# Patient Record
Sex: MALE | Race: WHITE | NOT HISPANIC OR LATINO | URBAN - METROPOLITAN AREA
[De-identification: names, ages, dates, MRNs, and addresses within clinical notes are randomized per-mention and may not be internally consistent; named-entity substitution may affect disease eponyms.]

---

## 2018-10-22 ENCOUNTER — INPATIENT (INPATIENT)
Facility: HOSPITAL | Age: 78
LOS: 2 days | Discharge: NURSING, REHAB OR LONG-TERM CARE | DRG: 603 | End: 2018-10-25
Attending: SURGERY | Admitting: SURGERY
Payer: MEDICARE

## 2018-10-22 VITALS
TEMPERATURE: 99 F | SYSTOLIC BLOOD PRESSURE: 137 MMHG | WEIGHT: 134.92 LBS | DIASTOLIC BLOOD PRESSURE: 83 MMHG | HEART RATE: 70 BPM | OXYGEN SATURATION: 100 % | RESPIRATION RATE: 18 BRPM

## 2018-10-22 LAB
ANION GAP SERPL CALC-SCNC: 16 MMOL/L — SIGNIFICANT CHANGE UP (ref 5–17)
APTT BLD: 30.6 SEC — SIGNIFICANT CHANGE UP (ref 27.5–37.4)
BASOPHILS NFR BLD AUTO: 0 % — SIGNIFICANT CHANGE UP (ref 0–2)
BLD GP AB SCN SERPL QL: NEGATIVE — SIGNIFICANT CHANGE UP
BUN SERPL-MCNC: 39 MG/DL — HIGH (ref 7–23)
CALCIUM SERPL-MCNC: 9.6 MG/DL — SIGNIFICANT CHANGE UP (ref 8.4–10.5)
CHLORIDE SERPL-SCNC: 91 MMOL/L — LOW (ref 96–108)
CO2 SERPL-SCNC: 27 MMOL/L — SIGNIFICANT CHANGE UP (ref 22–31)
CREAT SERPL-MCNC: 1.21 MG/DL — SIGNIFICANT CHANGE UP (ref 0.5–1.3)
EOSINOPHIL NFR BLD AUTO: 0.5 % — SIGNIFICANT CHANGE UP (ref 0–6)
GLUCOSE SERPL-MCNC: 125 MG/DL — HIGH (ref 70–99)
HCT VFR BLD CALC: 42.4 % — SIGNIFICANT CHANGE UP (ref 39–50)
HGB BLD-MCNC: 14.1 G/DL — SIGNIFICANT CHANGE UP (ref 13–17)
INR BLD: 1.53 — HIGH (ref 0.88–1.16)
LYMPHOCYTES # BLD AUTO: 4.5 % — LOW (ref 13–44)
MCHC RBC-ENTMCNC: 29.6 PG — SIGNIFICANT CHANGE UP (ref 27–34)
MCHC RBC-ENTMCNC: 33.3 G/DL — SIGNIFICANT CHANGE UP (ref 32–36)
MCV RBC AUTO: 88.9 FL — SIGNIFICANT CHANGE UP (ref 80–100)
MONOCYTES NFR BLD AUTO: 6.6 % — SIGNIFICANT CHANGE UP (ref 2–14)
NEUTROPHILS NFR BLD AUTO: 88.4 % — HIGH (ref 43–77)
PLATELET # BLD AUTO: 217 K/UL — SIGNIFICANT CHANGE UP (ref 150–400)
POTASSIUM SERPL-MCNC: 3.9 MMOL/L — SIGNIFICANT CHANGE UP (ref 3.5–5.3)
POTASSIUM SERPL-SCNC: 3.9 MMOL/L — SIGNIFICANT CHANGE UP (ref 3.5–5.3)
PROTHROM AB SERPL-ACNC: 17.1 SEC — HIGH (ref 9.8–12.7)
RBC # BLD: 4.77 M/UL — SIGNIFICANT CHANGE UP (ref 4.2–5.8)
RBC # FLD: 18.4 % — HIGH (ref 10.3–16.9)
RH IG SCN BLD-IMP: POSITIVE — SIGNIFICANT CHANGE UP
SODIUM SERPL-SCNC: 134 MMOL/L — LOW (ref 135–145)
WBC # BLD: 8.4 K/UL — SIGNIFICANT CHANGE UP (ref 3.8–10.5)
WBC # FLD AUTO: 8.4 K/UL — SIGNIFICANT CHANGE UP (ref 3.8–10.5)

## 2018-10-22 PROCEDURE — 71045 X-RAY EXAM CHEST 1 VIEW: CPT | Mod: 26

## 2018-10-22 PROCEDURE — 93970 EXTREMITY STUDY: CPT | Mod: 26

## 2018-10-22 PROCEDURE — 99285 EMERGENCY DEPT VISIT HI MDM: CPT

## 2018-10-22 RX ORDER — VANCOMYCIN HCL 1 G
1000 VIAL (EA) INTRAVENOUS EVERY 24 HOURS
Qty: 0 | Refills: 0 | Status: DISCONTINUED | OUTPATIENT
Start: 2018-10-22 | End: 2018-10-23

## 2018-10-22 RX ORDER — INFLUENZA VIRUS VACCINE 15; 15; 15; 15 UG/.5ML; UG/.5ML; UG/.5ML; UG/.5ML
0.5 SUSPENSION INTRAMUSCULAR ONCE
Qty: 0 | Refills: 0 | Status: DISCONTINUED | OUTPATIENT
Start: 2018-10-22 | End: 2018-10-25

## 2018-10-22 RX ORDER — ASPIRIN/CALCIUM CARB/MAGNESIUM 324 MG
1 TABLET ORAL
Qty: 0 | Refills: 0 | COMMUNITY

## 2018-10-22 RX ORDER — OXYCODONE AND ACETAMINOPHEN 5; 325 MG/1; MG/1
1 TABLET ORAL EVERY 6 HOURS
Qty: 0 | Refills: 0 | Status: DISCONTINUED | OUTPATIENT
Start: 2018-10-22 | End: 2018-10-25

## 2018-10-22 RX ORDER — DOCUSATE SODIUM 100 MG
100 CAPSULE ORAL THREE TIMES A DAY
Qty: 0 | Refills: 0 | Status: DISCONTINUED | OUTPATIENT
Start: 2018-10-22 | End: 2018-10-25

## 2018-10-22 RX ORDER — HEPARIN SODIUM 5000 [USP'U]/ML
5000 INJECTION INTRAVENOUS; SUBCUTANEOUS EVERY 8 HOURS
Qty: 0 | Refills: 0 | Status: DISCONTINUED | OUTPATIENT
Start: 2018-10-22 | End: 2018-10-22

## 2018-10-22 RX ORDER — TAMSULOSIN HYDROCHLORIDE 0.4 MG/1
0.4 CAPSULE ORAL AT BEDTIME
Qty: 0 | Refills: 0 | Status: DISCONTINUED | OUTPATIENT
Start: 2018-10-22 | End: 2018-10-25

## 2018-10-22 RX ORDER — OXYCODONE AND ACETAMINOPHEN 5; 325 MG/1; MG/1
2 TABLET ORAL EVERY 6 HOURS
Qty: 0 | Refills: 0 | Status: DISCONTINUED | OUTPATIENT
Start: 2018-10-22 | End: 2018-10-25

## 2018-10-22 RX ORDER — NYSTATIN CREAM 100000 [USP'U]/G
1 CREAM TOPICAL
Qty: 0 | Refills: 0 | Status: DISCONTINUED | OUTPATIENT
Start: 2018-10-22 | End: 2018-10-25

## 2018-10-22 RX ORDER — VANCOMYCIN HCL 1 G
1000 VIAL (EA) INTRAVENOUS ONCE
Qty: 0 | Refills: 0 | Status: COMPLETED | OUTPATIENT
Start: 2018-10-22 | End: 2018-10-22

## 2018-10-22 RX ORDER — TAMSULOSIN HYDROCHLORIDE 0.4 MG/1
1 CAPSULE ORAL
Qty: 0 | Refills: 0 | COMMUNITY

## 2018-10-22 RX ORDER — CIPROFLOXACIN LACTATE 400MG/40ML
500 VIAL (ML) INTRAVENOUS EVERY 12 HOURS
Qty: 0 | Refills: 0 | Status: DISCONTINUED | OUTPATIENT
Start: 2018-10-22 | End: 2018-10-24

## 2018-10-22 RX ORDER — LISINOPRIL 2.5 MG/1
1 TABLET ORAL
Qty: 0 | Refills: 0 | COMMUNITY

## 2018-10-22 RX ORDER — HEPARIN SODIUM 5000 [USP'U]/ML
1100 INJECTION INTRAVENOUS; SUBCUTANEOUS
Qty: 25000 | Refills: 0 | Status: DISCONTINUED | OUTPATIENT
Start: 2018-10-22 | End: 2018-10-23

## 2018-10-22 RX ORDER — OXYCODONE HYDROCHLORIDE 5 MG/1
15 TABLET ORAL EVERY 4 HOURS
Qty: 0 | Refills: 0 | Status: DISCONTINUED | OUTPATIENT
Start: 2018-10-22 | End: 2018-10-22

## 2018-10-22 RX ORDER — SENNA PLUS 8.6 MG/1
2 TABLET ORAL AT BEDTIME
Qty: 0 | Refills: 0 | Status: DISCONTINUED | OUTPATIENT
Start: 2018-10-22 | End: 2018-10-25

## 2018-10-22 RX ORDER — ASPIRIN/CALCIUM CARB/MAGNESIUM 324 MG
81 TABLET ORAL DAILY
Qty: 0 | Refills: 0 | Status: DISCONTINUED | OUTPATIENT
Start: 2018-10-22 | End: 2018-10-25

## 2018-10-22 RX ORDER — LISINOPRIL 2.5 MG/1
5 TABLET ORAL DAILY
Qty: 0 | Refills: 0 | Status: DISCONTINUED | OUTPATIENT
Start: 2018-10-22 | End: 2018-10-25

## 2018-10-22 RX ORDER — FUROSEMIDE 40 MG
1 TABLET ORAL
Qty: 0 | Refills: 0 | COMMUNITY

## 2018-10-22 RX ORDER — FUROSEMIDE 40 MG
80 TABLET ORAL DAILY
Qty: 0 | Refills: 0 | Status: DISCONTINUED | OUTPATIENT
Start: 2018-10-22 | End: 2018-10-25

## 2018-10-22 RX ADMIN — Medication 81 MILLIGRAM(S): at 19:25

## 2018-10-22 RX ADMIN — LISINOPRIL 5 MILLIGRAM(S): 2.5 TABLET ORAL at 19:25

## 2018-10-22 RX ADMIN — Medication 80 MILLIGRAM(S): at 19:26

## 2018-10-22 RX ADMIN — Medication 250 MILLIGRAM(S): at 13:38

## 2018-10-22 RX ADMIN — OXYCODONE AND ACETAMINOPHEN 2 TABLET(S): 5; 325 TABLET ORAL at 17:40

## 2018-10-22 RX ADMIN — OXYCODONE AND ACETAMINOPHEN 2 TABLET(S): 5; 325 TABLET ORAL at 21:19

## 2018-10-22 RX ADMIN — Medication 1000 MILLIGRAM(S): at 17:18

## 2018-10-22 RX ADMIN — TAMSULOSIN HYDROCHLORIDE 0.4 MILLIGRAM(S): 0.4 CAPSULE ORAL at 22:19

## 2018-10-22 RX ADMIN — HEPARIN SODIUM 11 UNIT(S)/HR: 5000 INJECTION INTRAVENOUS; SUBCUTANEOUS at 19:25

## 2018-10-22 RX ADMIN — Medication 500 MILLIGRAM(S): at 19:25

## 2018-10-22 NOTE — H&P ADULT - NSHPPHYSICALEXAM_GEN_ALL_CORE
gen- nad  cardio- s1s2 rrr  abd- soft nt/nd  ext- +Erythema with superficial skin breakdown to distal lower extemities, +swelling to thigh bilaterally.  + Foul smelling sanguinous weapage to bilateral LEs   RLE- cool to touch up to ankle  Vascular- RLE- no DP sig mono PT sig  +triphasic pop 2+ pal fem  LLE-

## 2018-10-22 NOTE — ED PROVIDER NOTE - SKIN, MLM
+ Erythema with superficial skin breakdown to distal lower extemities, bilaterally.  + Foul smelling sanguinous weapage to bilateral LEs.

## 2018-10-22 NOTE — H&P ADULT - NSHPLABSRESULTS_GEN_ALL_CORE
US- No deep vein thrombosis seen above the knees. Acute thrombosis of right calf intramuscular vein.

## 2018-10-22 NOTE — H&P ADULT - HISTORY OF PRESENT ILLNESS
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  Patient notes he has had skin breakdown to bilateral lower extremities over the past several months, however feels it is getting significantly worse as of late.  He presents to ED with bilateral LEs wrapped in gauze and kerlex with small amount of weapage and foul smell coming from dressings.  Per house aide at bedside, patient currently resides in an assisted living facility, though she does not know if his dressings are being changed there.  Patient denies associated fever, chills, chest pain, shortness of breath, cough, abdominal pain, nausea, vomiting or any additional acute complaints or concerns at this time.

## 2018-10-22 NOTE — H&P ADULT - ASSESSMENT
78yoM with b/l LE skin breakdown, swelling and pain    -admit to vascular  -pain control  -cont home meds  -IV abx-vanc/cipro  -f/u labs  -local wound care

## 2018-10-22 NOTE — ED PROVIDER NOTE - MEDICAL DECISION MAKING DETAILS
Patient in ED with concern for bilateral LE pain and swelling.  Sent by PCP for admission to vascular surgery and further eval.  Vasc sx in ED to eval and requesting admission to Dr. Maza.  No further imaging as per vascular surgery at this time.  Patient is aware of plan for admission and agreeable.  Will admit at this time.

## 2018-10-22 NOTE — ED PROVIDER NOTE - NEUROLOGICAL, MLM
Alert and oriented, no focal deficits, no motor or sensory deficits.  No palpable or dopplerable pulses to right distal LE.  Dopplerable pulses to left distal LE.  + Cool right LE, distally.

## 2018-10-22 NOTE — ED PROVIDER NOTE - PROGRESS NOTE DETAILS
I received call from radiologist reviewing US with concern for thrombosed right calf muscle vein.  I did speak with [facility] surgery re this finding.  [facility] is aware and will review imaging, however has no further recs at this time.  Will proceed with vascular admission.

## 2018-10-22 NOTE — ED ADULT NURSE NOTE - OBJECTIVE STATEMENT
Pt presents BIBA c/o 5/10 BLE pain and swelling.  Pt has multiple wounds to LE.  Pt is unable to ambulate.  No pulses palpable to LRE.  Pt has visibly pale and cold LRE.  Pulses confirmed to LLE by doppler only.  Pt denies fever/chills, falls, cough, or dizziness.  Pt is pending labs.  Will continue to reassess and reevaluate.

## 2018-10-22 NOTE — ED PROVIDER NOTE - CARE PLAN
Principal Discharge DX:	Pain in both lower extremities  Secondary Diagnosis:	Vascular disease, peripheral  Secondary Diagnosis:	Cellulitis of lower extremity, unspecified laterality

## 2018-10-22 NOTE — ED PROVIDER NOTE - OBJECTIVE STATEMENT
78 year old male presents to ED with concern for lower extremity pain and swelling - sent by his PCP per report for vascular evaluation.  Patient notes he has had skin breakdown to bilateral lower extremities over the past several months, however feels it is getting significantly worse as of late.  He presents to ED with bilateral LEs wrapped in gauze and kerlex with small amount of weapage and foul smell coming from dressings.  Per house aide at bedside, patient currently resides in an assisted living facility, though she does not know if his dressings are being changed there.  Patient denies associated fever, chills, chest pain, shortness of breath, cough, abdominal pain, nausea, vomiting or any additional acute complaints or concerns at this time.

## 2018-10-22 NOTE — ED PROVIDER NOTE - CONSTITUTIONAL, MLM
normal... Elderly appearing, well nourished, awake, alert, oriented to person, place, time/situation and in no apparent distress.

## 2018-10-23 LAB
ALBUMIN SERPL ELPH-MCNC: 3.4 G/DL — SIGNIFICANT CHANGE UP (ref 3.3–5)
ALP SERPL-CCNC: 143 U/L — HIGH (ref 40–120)
ALT FLD-CCNC: 14 U/L — SIGNIFICANT CHANGE UP (ref 10–45)
ANION GAP SERPL CALC-SCNC: 19 MMOL/L — HIGH (ref 5–17)
APTT BLD: 40.5 SEC — HIGH (ref 27.5–37.4)
APTT BLD: 92 SEC — HIGH (ref 27.5–37.4)
AST SERPL-CCNC: 15 U/L — SIGNIFICANT CHANGE UP (ref 10–40)
BILIRUB SERPL-MCNC: 1.9 MG/DL — HIGH (ref 0.2–1.2)
BUN SERPL-MCNC: 44 MG/DL — HIGH (ref 7–23)
CALCIUM SERPL-MCNC: 9.6 MG/DL — SIGNIFICANT CHANGE UP (ref 8.4–10.5)
CHLORIDE SERPL-SCNC: 87 MMOL/L — LOW (ref 96–108)
CO2 SERPL-SCNC: 25 MMOL/L — SIGNIFICANT CHANGE UP (ref 22–31)
CREAT SERPL-MCNC: 1.48 MG/DL — HIGH (ref 0.5–1.3)
GLUCOSE SERPL-MCNC: 148 MG/DL — HIGH (ref 70–99)
GRAM STN FLD: SIGNIFICANT CHANGE UP
HBA1C BLD-MCNC: 6.4 % — HIGH (ref 4–5.6)
HCT VFR BLD CALC: 43.3 % — SIGNIFICANT CHANGE UP (ref 39–50)
HGB BLD-MCNC: 14.4 G/DL — SIGNIFICANT CHANGE UP (ref 13–17)
INR BLD: 1.49 — HIGH (ref 0.88–1.16)
MCHC RBC-ENTMCNC: 29.4 PG — SIGNIFICANT CHANGE UP (ref 27–34)
MCHC RBC-ENTMCNC: 33.3 G/DL — SIGNIFICANT CHANGE UP (ref 32–36)
MCV RBC AUTO: 88.5 FL — SIGNIFICANT CHANGE UP (ref 80–100)
PLATELET # BLD AUTO: 218 K/UL — SIGNIFICANT CHANGE UP (ref 150–400)
POTASSIUM SERPL-MCNC: 3.8 MMOL/L — SIGNIFICANT CHANGE UP (ref 3.5–5.3)
POTASSIUM SERPL-SCNC: 3.8 MMOL/L — SIGNIFICANT CHANGE UP (ref 3.5–5.3)
PROT SERPL-MCNC: 6.8 G/DL — SIGNIFICANT CHANGE UP (ref 6–8.3)
PROTHROM AB SERPL-ACNC: 16.7 SEC — HIGH (ref 9.8–12.7)
RBC # BLD: 4.89 M/UL — SIGNIFICANT CHANGE UP (ref 4.2–5.8)
RBC # FLD: 18.3 % — HIGH (ref 10.3–16.9)
SODIUM SERPL-SCNC: 131 MMOL/L — LOW (ref 135–145)
SPECIMEN SOURCE: SIGNIFICANT CHANGE UP
WBC # BLD: 7 K/UL — SIGNIFICANT CHANGE UP (ref 3.8–10.5)
WBC # FLD AUTO: 7 K/UL — SIGNIFICANT CHANGE UP (ref 3.8–10.5)

## 2018-10-23 PROCEDURE — 71046 X-RAY EXAM CHEST 2 VIEWS: CPT | Mod: 26

## 2018-10-23 RX ORDER — HEPARIN SODIUM 5000 [USP'U]/ML
5000 INJECTION INTRAVENOUS; SUBCUTANEOUS EVERY 8 HOURS
Qty: 0 | Refills: 0 | Status: DISCONTINUED | OUTPATIENT
Start: 2018-10-23 | End: 2018-10-25

## 2018-10-23 RX ORDER — HEPARIN SODIUM 5000 [USP'U]/ML
1300 INJECTION INTRAVENOUS; SUBCUTANEOUS
Qty: 25000 | Refills: 0 | Status: DISCONTINUED | OUTPATIENT
Start: 2018-10-23 | End: 2018-10-23

## 2018-10-23 RX ADMIN — Medication 80 MILLIGRAM(S): at 07:20

## 2018-10-23 RX ADMIN — TAMSULOSIN HYDROCHLORIDE 0.4 MILLIGRAM(S): 0.4 CAPSULE ORAL at 21:48

## 2018-10-23 RX ADMIN — NYSTATIN CREAM 1 APPLICATION(S): 100000 CREAM TOPICAL at 11:57

## 2018-10-23 RX ADMIN — HEPARIN SODIUM 5000 UNIT(S): 5000 INJECTION INTRAVENOUS; SUBCUTANEOUS at 21:48

## 2018-10-23 RX ADMIN — OXYCODONE AND ACETAMINOPHEN 1 TABLET(S): 5; 325 TABLET ORAL at 18:31

## 2018-10-23 RX ADMIN — Medication 500 MILLIGRAM(S): at 06:29

## 2018-10-23 RX ADMIN — Medication 100 MILLIGRAM(S): at 06:29

## 2018-10-23 RX ADMIN — NYSTATIN CREAM 1 APPLICATION(S): 100000 CREAM TOPICAL at 00:19

## 2018-10-23 RX ADMIN — Medication 500 MILLIGRAM(S): at 18:31

## 2018-10-23 RX ADMIN — HEPARIN SODIUM 13 UNIT(S)/HR: 5000 INJECTION INTRAVENOUS; SUBCUTANEOUS at 02:12

## 2018-10-23 RX ADMIN — LISINOPRIL 5 MILLIGRAM(S): 2.5 TABLET ORAL at 06:29

## 2018-10-23 RX ADMIN — SENNA PLUS 2 TABLET(S): 8.6 TABLET ORAL at 21:48

## 2018-10-23 RX ADMIN — Medication 100 MILLIGRAM(S): at 21:48

## 2018-10-23 RX ADMIN — NYSTATIN CREAM 1 APPLICATION(S): 100000 CREAM TOPICAL at 21:49

## 2018-10-23 RX ADMIN — Medication 81 MILLIGRAM(S): at 14:39

## 2018-10-23 RX ADMIN — Medication 100 MILLIGRAM(S): at 14:39

## 2018-10-23 NOTE — PROGRESS NOTE ADULT - SUBJECTIVE AND OBJECTIVE BOX
24hr Events:  O/N:12AM 40.4, heparin rate increased from 11ml/hr to 13ml/hr, VSS  10/22: admitted for IV abx- vanc/cipro po, local wound care, and pain control; started heparin gtt for Acute thrombosis of right calf intramuscular vein        Assessment/Plan;  1yoM with b/l LE skin breakdown, swelling and pain    - Heparin gtt  -pain control  -cont home meds  -IV abx-vanc/cipro  -f/u AM labs  -local wound care 24hr Events:  O/N:12AM 40.4, heparin rate increased from 11ml/hr to 13ml/hr, VSS  10/22: admitted for IV abx- vanc/cipro po, local wound care, and pain control; started heparin gtt for Acute thrombosis of right calf intramuscular vein    ciprofloxacin     Tablet 500  vancomycin  IVPB 1000  aspirin enteric coated 81  ciprofloxacin     Tablet 500  furosemide    Tablet 80  heparin  Infusion 1300  lisinopril 5  tamsulosin 0.4  vancomycin  IVPB 1000        Vital Signs Last 24 Hrs  T(C): 37.2 (23 Oct 2018 05:56), Max: 37.2 (22 Oct 2018 14:15)  T(F): 98.9 (23 Oct 2018 05:56), Max: 98.9 (22 Oct 2018 14:15)  HR: 66 (23 Oct 2018 06:28) (65 - 78)  BP: 118/61 (23 Oct 2018 06:28) (105/70 - 137/83)  BP(mean): --  RR: 18 (23 Oct 2018 06:28) (16 - 18)  SpO2: 96% (23 Oct 2018 06:28) (96% - 100%)  I&O's Summary    22 Oct 2018 07:01  -  23 Oct 2018 07:00  --------------------------------------------------------  IN: 87 mL / OUT: 125 mL / NET: -38 mL        Physical Exam:  General: NAD, resting comfortably in bed  Pulmonary: Nonlabored breathing, no respiratory distress  	ext- +Erythema with superficial skin breakdown to distal lower extemities, +swelling to thigh bilaterally.  +sanguinous weapage to bilateral LEs   	RLE- today foot is now warm to touch  	Vascular- RLE- biphasic sig biphasic PT sig      LABS:                        14.1   8.4   )-----------( 217      ( 22 Oct 2018 13:39 )             42.4     10-22    134<L>  |  91<L>  |  39<H>  ----------------------------<  125<H>  3.9   |  27  |  1.21    Ca    9.6      22 Oct 2018 13:39      PT/INR - ( 22 Oct 2018 13:39 )   PT: 17.1 sec;   INR: 1.53          PTT - ( 23 Oct 2018 00:31 )  PTT:40.5 sec        Assessment/Plan;  78yoM with b/l LE skin breakdown, swelling and pain    -Heparin gtt  -pain control  -cont home meds  -IV abx-vanc/cipro  -f/u AM labs  -local wound care

## 2018-10-23 NOTE — PROVIDER CONTACT NOTE (OTHER) - ASSESSMENT
Heparin running at 13mL/hr. Pt refusing all AM labs, stating he will only allow blood to be drawn q2days. Pt educated on importance of blood draws and monitor coagulation while on a heparin drip.

## 2018-10-23 NOTE — PROVIDER CONTACT NOTE (OTHER) - SITUATION
Vascular team was paged and text notified with no response that patient is refusing AM labs. Pt stated at 12am that he would refuse AM draw, PATRICE Rogers notified at that time. Pt on heparin drip

## 2018-10-24 LAB
ALBUMIN SERPL ELPH-MCNC: 3 G/DL — LOW (ref 3.3–5)
ALP SERPL-CCNC: 115 U/L — SIGNIFICANT CHANGE UP (ref 40–120)
ALT FLD-CCNC: 12 U/L — SIGNIFICANT CHANGE UP (ref 10–45)
ANION GAP SERPL CALC-SCNC: 15 MMOL/L — SIGNIFICANT CHANGE UP (ref 5–17)
AST SERPL-CCNC: 11 U/L — SIGNIFICANT CHANGE UP (ref 10–40)
BILIRUB SERPL-MCNC: 1.3 MG/DL — HIGH (ref 0.2–1.2)
BUN SERPL-MCNC: 41 MG/DL — HIGH (ref 7–23)
CALCIUM SERPL-MCNC: 9 MG/DL — SIGNIFICANT CHANGE UP (ref 8.4–10.5)
CHLORIDE SERPL-SCNC: 93 MMOL/L — LOW (ref 96–108)
CO2 SERPL-SCNC: 27 MMOL/L — SIGNIFICANT CHANGE UP (ref 22–31)
CREAT SERPL-MCNC: 1.31 MG/DL — HIGH (ref 0.5–1.3)
GLUCOSE SERPL-MCNC: 108 MG/DL — HIGH (ref 70–99)
HCT VFR BLD CALC: 39.4 % — SIGNIFICANT CHANGE UP (ref 39–50)
HGB BLD-MCNC: 13 G/DL — SIGNIFICANT CHANGE UP (ref 13–17)
MAGNESIUM SERPL-MCNC: 2.1 MG/DL — SIGNIFICANT CHANGE UP (ref 1.6–2.6)
MCHC RBC-ENTMCNC: 29.3 PG — SIGNIFICANT CHANGE UP (ref 27–34)
MCHC RBC-ENTMCNC: 33 G/DL — SIGNIFICANT CHANGE UP (ref 32–36)
MCV RBC AUTO: 88.9 FL — SIGNIFICANT CHANGE UP (ref 80–100)
PLATELET # BLD AUTO: 182 K/UL — SIGNIFICANT CHANGE UP (ref 150–400)
POTASSIUM SERPL-MCNC: 3.7 MMOL/L — SIGNIFICANT CHANGE UP (ref 3.5–5.3)
POTASSIUM SERPL-SCNC: 3.7 MMOL/L — SIGNIFICANT CHANGE UP (ref 3.5–5.3)
PROT SERPL-MCNC: 6.2 G/DL — SIGNIFICANT CHANGE UP (ref 6–8.3)
RBC # BLD: 4.43 M/UL — SIGNIFICANT CHANGE UP (ref 4.2–5.8)
RBC # FLD: 18.4 % — HIGH (ref 10.3–16.9)
SODIUM SERPL-SCNC: 135 MMOL/L — SIGNIFICANT CHANGE UP (ref 135–145)
VANCOMYCIN TROUGH SERPL-MCNC: 5.9 UG/ML — LOW (ref 10–20)
WBC # BLD: 6.5 K/UL — SIGNIFICANT CHANGE UP (ref 3.8–10.5)
WBC # FLD AUTO: 6.5 K/UL — SIGNIFICANT CHANGE UP (ref 3.8–10.5)

## 2018-10-24 RX ORDER — POTASSIUM CHLORIDE 20 MEQ
40 PACKET (EA) ORAL ONCE
Qty: 0 | Refills: 0 | Status: COMPLETED | OUTPATIENT
Start: 2018-10-24 | End: 2018-10-25

## 2018-10-24 RX ORDER — CEFEPIME 1 G/1
2000 INJECTION, POWDER, FOR SOLUTION INTRAMUSCULAR; INTRAVENOUS ONCE
Qty: 0 | Refills: 0 | Status: COMPLETED | OUTPATIENT
Start: 2018-10-24 | End: 2018-10-24

## 2018-10-24 RX ORDER — CEFEPIME 1 G/1
INJECTION, POWDER, FOR SOLUTION INTRAMUSCULAR; INTRAVENOUS
Qty: 0 | Refills: 0 | Status: DISCONTINUED | OUTPATIENT
Start: 2018-10-24 | End: 2018-10-25

## 2018-10-24 RX ORDER — CEFEPIME 1 G/1
2000 INJECTION, POWDER, FOR SOLUTION INTRAMUSCULAR; INTRAVENOUS EVERY 24 HOURS
Qty: 0 | Refills: 0 | Status: DISCONTINUED | OUTPATIENT
Start: 2018-10-25 | End: 2018-10-25

## 2018-10-24 RX ADMIN — Medication 500 MILLIGRAM(S): at 06:28

## 2018-10-24 RX ADMIN — SENNA PLUS 2 TABLET(S): 8.6 TABLET ORAL at 22:00

## 2018-10-24 RX ADMIN — Medication 100 MILLIGRAM(S): at 22:01

## 2018-10-24 RX ADMIN — Medication 100 MILLIGRAM(S): at 06:28

## 2018-10-24 RX ADMIN — TAMSULOSIN HYDROCHLORIDE 0.4 MILLIGRAM(S): 0.4 CAPSULE ORAL at 22:00

## 2018-10-24 RX ADMIN — CEFEPIME 100 MILLIGRAM(S): 1 INJECTION, POWDER, FOR SOLUTION INTRAMUSCULAR; INTRAVENOUS at 22:01

## 2018-10-24 RX ADMIN — Medication 80 MILLIGRAM(S): at 06:28

## 2018-10-24 RX ADMIN — OXYCODONE AND ACETAMINOPHEN 1 TABLET(S): 5; 325 TABLET ORAL at 07:08

## 2018-10-24 RX ADMIN — Medication 100 MILLIGRAM(S): at 12:13

## 2018-10-24 RX ADMIN — HEPARIN SODIUM 5000 UNIT(S): 5000 INJECTION INTRAVENOUS; SUBCUTANEOUS at 12:13

## 2018-10-24 RX ADMIN — NYSTATIN CREAM 1 APPLICATION(S): 100000 CREAM TOPICAL at 22:01

## 2018-10-24 RX ADMIN — Medication 81 MILLIGRAM(S): at 12:12

## 2018-10-24 RX ADMIN — HEPARIN SODIUM 5000 UNIT(S): 5000 INJECTION INTRAVENOUS; SUBCUTANEOUS at 06:28

## 2018-10-24 RX ADMIN — NYSTATIN CREAM 1 APPLICATION(S): 100000 CREAM TOPICAL at 10:00

## 2018-10-24 RX ADMIN — OXYCODONE AND ACETAMINOPHEN 1 TABLET(S): 5; 325 TABLET ORAL at 07:51

## 2018-10-24 RX ADMIN — HEPARIN SODIUM 5000 UNIT(S): 5000 INJECTION INTRAVENOUS; SUBCUTANEOUS at 22:01

## 2018-10-24 NOTE — PROGRESS NOTE ADULT - SUBJECTIVE AND OBJECTIVE BOX
24hr Events:  O/N: KIMBERLY, VSS  10/23: [facility] plan: Stop heparin (pt refusing labs). Reassess legs 10/24, if still improving, No angio and start PO AC.  Creatinine rising 1.4.  Vanco trough 10/24 12noon. Hold vanco dose until result. Full set of labs ordered for 10/24 at 12noon. hgb a1c- 6.4  f/u CXR AP/lat.  CXR showed possible right hilar mass.         Assessment/Plan:  78yoM with b/l LE skin breakdown, swelling and pain      -pain control  -cont home meds  -Ciprofloxacin  -f/u AM labs  -local wound care  -F/u 12pm labs 24hr Events:  O/N: KIMBERLY, VSS  10/23: [facility] plan: Stop heparin (pt refusing labs). Reassess legs 10/24, if still improving, No angio and start PO AC.  Creatinine rising 1.4.  Vanco trough 10/24 12noon. Hold vanco dose until result. Full set of labs ordered for 10/24 at 12noon. hgb a1c- 6.4  f/u CXR AP/lat.  CXR showed possible right hilar mass.     ciprofloxacin     Tablet 500  aspirin enteric coated 81  ciprofloxacin     Tablet 500  furosemide    Tablet 80  heparin  Injectable 5000  lisinopril 5  tamsulosin 0.4        Vital Signs Last 24 Hrs  T(C): 36.2 (24 Oct 2018 05:32), Max: 36.4 (24 Oct 2018 00:11)  T(F): 97.1 (24 Oct 2018 05:32), Max: 97.5 (24 Oct 2018 00:11)  HR: 64 (24 Oct 2018 06:24) (58 - 82)  BP: 106/57 (24 Oct 2018 06:24) (103/56 - 106/57)  BP(mean): --  RR: 16 (24 Oct 2018 06:24) (16 - 18)  SpO2: 97% (24 Oct 2018 06:24) (94% - 97%)  I&O's Summary    23 Oct 2018 07:01  -  24 Oct 2018 07:00  --------------------------------------------------------  IN: 360 mL / OUT: 400 mL / NET: -40 mL        Physical Exam:  General: NAD, resting comfortably in bed  Pulmonary: Nonlabored breathing, no respiratory distress  ext- +Erythema with superficial skin breakdown to distal lower extemities, +swelling to thigh bilaterally.  +sanguinous weapage to bilateral LEs   	RLE- foot is now warm to touch  	Vascular- RLE- biphasic sig biphasic PT sig      LABS:                        14.4   7.0   )-----------( 218      ( 23 Oct 2018 11:24 )             43.3     10-23    131<L>  |  87<L>  |  44<H>  ----------------------------<  148<H>  3.8   |  25  |  1.48<H>    Ca    9.6      23 Oct 2018 11:23    TPro  6.8  /  Alb  3.4  /  TBili  1.9<H>  /  DBili  x   /  AST  15  /  ALT  14  /  AlkPhos  143<H>  10-23    PT/INR - ( 23 Oct 2018 11:24 )   PT: 16.7 sec;   INR: 1.49          PTT - ( 23 Oct 2018 11:24 )  PTT:92.0 sec        Assessment/Plan:  78yoM with b/l LE skin breakdown, swelling and pain      -pain control  -cont home meds  -Ciprofloxacin  -f/u AM labs  -local wound care  -F/u 12pm labs

## 2018-10-24 NOTE — PHYSICAL THERAPY INITIAL EVALUATION ADULT - TRANSFER SAFETY CONCERNS NOTED: SIT/STAND, REHAB EVAL
losing balance/decreased step length/decreased safety awareness/decreased weight-shifting ability/decreased balance during turns

## 2018-10-24 NOTE — PHYSICAL THERAPY INITIAL EVALUATION ADULT - ADDITIONAL COMMENTS
pt states that he lives alone in an elevator access apt building w/ no steps to enter. States that he has a home health aide 4hrs/day x7days. Denies hx of falls w/in past 6 months. States that he uses a rollator for ambulation which is in the room with him

## 2018-10-25 ENCOUNTER — TRANSCRIPTION ENCOUNTER (OUTPATIENT)
Age: 78
End: 2018-10-25

## 2018-10-25 VITALS — TEMPERATURE: 98 F

## 2018-10-25 LAB
-  AMIKACIN: SIGNIFICANT CHANGE UP
-  AMIKACIN: SIGNIFICANT CHANGE UP
-  AMPICILLIN/SULBACTAM: SIGNIFICANT CHANGE UP
-  AMPICILLIN/SULBACTAM: SIGNIFICANT CHANGE UP
-  AMPICILLIN: SIGNIFICANT CHANGE UP
-  AMPICILLIN: SIGNIFICANT CHANGE UP
-  AZTREONAM: SIGNIFICANT CHANGE UP
-  AZTREONAM: SIGNIFICANT CHANGE UP
-  CEFAZOLIN: SIGNIFICANT CHANGE UP
-  CEFAZOLIN: SIGNIFICANT CHANGE UP
-  CEFEPIME: SIGNIFICANT CHANGE UP
-  CEFEPIME: SIGNIFICANT CHANGE UP
-  CEFOTAXIME: SIGNIFICANT CHANGE UP
-  CEFOXITIN: SIGNIFICANT CHANGE UP
-  CEFTAZIDIME: SIGNIFICANT CHANGE UP
-  CEFTRIAXONE: SIGNIFICANT CHANGE UP
-  CEFUROXIME: SIGNIFICANT CHANGE UP
-  CIPROFLOXACIN: SIGNIFICANT CHANGE UP
-  ERTAPENEM: SIGNIFICANT CHANGE UP
-  GENTAMICIN: SIGNIFICANT CHANGE UP
-  LEVOFLOXACIN: SIGNIFICANT CHANGE UP
-  LEVOFLOXACIN: SIGNIFICANT CHANGE UP
-  MEROPENEM: SIGNIFICANT CHANGE UP
-  MEROPENEM: SIGNIFICANT CHANGE UP
-  MOXIFLOXACIN(AEROBIC): SIGNIFICANT CHANGE UP
-  PIPERACILLIN/TAZOBACTAM: SIGNIFICANT CHANGE UP
-  TETRACYCLINE: SIGNIFICANT CHANGE UP
-  TIGECYCLINE: SIGNIFICANT CHANGE UP
-  TOBRAMYCIN: SIGNIFICANT CHANGE UP
-  TRIMETHOPRIM/SULFAMETHOXAZOLE: SIGNIFICANT CHANGE UP
ANION GAP SERPL CALC-SCNC: 12 MMOL/L — SIGNIFICANT CHANGE UP (ref 5–17)
APTT BLD: 31.7 SEC — SIGNIFICANT CHANGE UP (ref 27.5–37.4)
BUN SERPL-MCNC: 43 MG/DL — HIGH (ref 7–23)
CALCIUM SERPL-MCNC: 9.3 MG/DL — SIGNIFICANT CHANGE UP (ref 8.4–10.5)
CHLORIDE SERPL-SCNC: 89 MMOL/L — LOW (ref 96–108)
CO2 SERPL-SCNC: 30 MMOL/L — SIGNIFICANT CHANGE UP (ref 22–31)
CREAT SERPL-MCNC: 1.32 MG/DL — HIGH (ref 0.5–1.3)
CULTURE RESULTS: SIGNIFICANT CHANGE UP
GLUCOSE SERPL-MCNC: 144 MG/DL — HIGH (ref 70–99)
INR BLD: 1.27 — HIGH (ref 0.88–1.16)
MAGNESIUM SERPL-MCNC: 2.3 MG/DL — SIGNIFICANT CHANGE UP (ref 1.6–2.6)
METHOD TYPE: SIGNIFICANT CHANGE UP
ORGANISM # SPEC MICROSCOPIC CNT: SIGNIFICANT CHANGE UP
PHOSPHATE SERPL-MCNC: 3.5 MG/DL — SIGNIFICANT CHANGE UP (ref 2.5–4.5)
POTASSIUM SERPL-MCNC: 4.5 MMOL/L — SIGNIFICANT CHANGE UP (ref 3.5–5.3)
POTASSIUM SERPL-SCNC: 4.5 MMOL/L — SIGNIFICANT CHANGE UP (ref 3.5–5.3)
PROTHROM AB SERPL-ACNC: 14.2 SEC — HIGH (ref 9.8–12.7)
SODIUM SERPL-SCNC: 131 MMOL/L — LOW (ref 135–145)
SPECIMEN SOURCE: SIGNIFICANT CHANGE UP

## 2018-10-25 PROCEDURE — 93971 EXTREMITY STUDY: CPT | Mod: 26,RT

## 2018-10-25 RX ORDER — CIPROFLOXACIN LACTATE 400MG/40ML
1 VIAL (ML) INTRAVENOUS
Qty: 28 | Refills: 0 | OUTPATIENT
Start: 2018-10-25 | End: 2018-11-07

## 2018-10-25 RX ORDER — SENNA PLUS 8.6 MG/1
2 TABLET ORAL
Qty: 0 | Refills: 0 | COMMUNITY
Start: 2018-10-25

## 2018-10-25 RX ORDER — DOCUSATE SODIUM 100 MG
1 CAPSULE ORAL
Qty: 0 | Refills: 0 | COMMUNITY
Start: 2018-10-25

## 2018-10-25 RX ORDER — CIPROFLOXACIN LACTATE 400MG/40ML
500 VIAL (ML) INTRAVENOUS EVERY 12 HOURS
Qty: 0 | Refills: 0 | Status: DISCONTINUED | OUTPATIENT
Start: 2018-10-25 | End: 2018-10-25

## 2018-10-25 RX ADMIN — Medication 100 MILLIGRAM(S): at 05:40

## 2018-10-25 RX ADMIN — HEPARIN SODIUM 5000 UNIT(S): 5000 INJECTION INTRAVENOUS; SUBCUTANEOUS at 05:40

## 2018-10-25 RX ADMIN — Medication 81 MILLIGRAM(S): at 14:18

## 2018-10-25 RX ADMIN — Medication 500 MILLIGRAM(S): at 14:18

## 2018-10-25 RX ADMIN — Medication 40 MILLIEQUIVALENT(S): at 07:44

## 2018-10-25 RX ADMIN — Medication 80 MILLIGRAM(S): at 05:40

## 2018-10-25 NOTE — DISCHARGE NOTE ADULT - CARE PLAN
Principal Discharge DX:	Cellulitis of lower extremity, unspecified laterality  Goal:	Follow up  Assessment and plan of treatment:	Follow up with Dr. Hairston in 1-2 weeks in office at [phone]  Secondary Diagnosis:	Vascular disease, peripheral  Goal:	Recovery  Assessment and plan of treatment:	General Discharge Instructions:  Please resume all regular home medications unless specifically advised not to take a particular medication. Also, please take any new medications as prescribed.  Please get plenty of rest, continue to ambulate several times per day, and drink adequate amounts of fluids. Avoid lifting weights greater than 5-10 lbs until you follow-up with your surgeon, who will instruct you further regarding activity restrictions.  Avoid driving or operating heavy machinery while taking pain medications.  Please follow-up with your surgeon and Primary Care Provider (PCP) as advised.  Incision Care:  *Please call your doctor or nurse practitioner if you have increased pain, swelling, redness, or drainage from the incision site.  *Avoid swimming and baths until your follow-up appointment.  *You may shower, and wash surgical incisions with a mild soap and warm water. Gently pat the area dry.  Secondary Diagnosis:	Pain in both lower extremities  Goal:	Warning signs  Assessment and plan of treatment:	Warning Signs:  Please call your doctor or nurse practitioner if you experience the following:  *You experience new chest pain, pressure, squeezing or tightness.  *New or worsening cough, shortness of breath, or wheeze.  *If you are vomiting and cannot keep down fluids or your medications.  *You are getting dehydrated due to continued vomiting, diarrhea, or other reasons. Signs of dehydration include dry mouth, rapid heartbeat, or feeling dizzy or faint when standing.  *You see blood or dark/black material when you vomit or have a bowel movement.  *You experience burning when you urinate, have blood in your urine, or experience a discharge.  *Your pain is not improving within 8-12 hours or is not gone within 24 hours. Call or return immediately if your pain is getting worse, changes location, or moves to your chest or back.  *You have shaking chills, or fever greater than 101.5 degrees Fahrenheit or 38 degrees Celsius.  *Any change in your symptoms, or any new symptoms that concern you. Principal Discharge DX:	Cellulitis of lower extremity, unspecified laterality  Goal:	Follow up  Assessment and plan of treatment:	Follow up with Dr. Hairston in 1-2 weeks in office at [phone]  Follow up with PCP concerning chest xray results  Follow up with repeat venous duplex in 2 weeks  Secondary Diagnosis:	Vascular disease, peripheral  Goal:	Recovery  Assessment and plan of treatment:	General Discharge Instructions:  Please resume all regular home medications unless specifically advised not to take a particular medication. Also, please take any new medications as prescribed.  Please get plenty of rest, continue to ambulate several times per day, and drink adequate amounts of fluids. Avoid lifting weights greater than 5-10 lbs until you follow-up with your surgeon, who will instruct you further regarding activity restrictions.  Avoid driving or operating heavy machinery while taking pain medications.  Please follow-up with your surgeon and Primary Care Provider (PCP) as advised.  Incision Care:  *Please call your doctor or nurse practitioner if you have increased pain, swelling, redness, or drainage from the incision site.  *Avoid swimming and baths until your follow-up appointment.  *You may shower, and wash surgical incisions with a mild soap and warm water. Gently pat the area dry.  Secondary Diagnosis:	Pain in both lower extremities  Goal:	Warning signs  Assessment and plan of treatment:	Warning Signs:  Please call your doctor or nurse practitioner if you experience the following:  *You experience new chest pain, pressure, squeezing or tightness.  *New or worsening cough, shortness of breath, or wheeze.  *If you are vomiting and cannot keep down fluids or your medications.  *You are getting dehydrated due to continued vomiting, diarrhea, or other reasons. Signs of dehydration include dry mouth, rapid heartbeat, or feeling dizzy or faint when standing.  *You see blood or dark/black material when you vomit or have a bowel movement.  *You experience burning when you urinate, have blood in your urine, or experience a discharge.  *Your pain is not improving within 8-12 hours or is not gone within 24 hours. Call or return immediately if your pain is getting worse, changes location, or moves to your chest or back.  *You have shaking chills, or fever greater than 101.5 degrees Fahrenheit or 38 degrees Celsius.  *Any change in your symptoms, or any new symptoms that concern you.  Goal:	Wound care  Assessment and plan of treatment:	wet to dry dressing changes to bilateral lower extremities. damp 4x4 on affected areas, wrap with krilex, and then ace bandage Principal Discharge DX:	Cellulitis of lower extremity, unspecified laterality  Goal:	Follow up  Assessment and plan of treatment:	Follow up with Dr. Hairston in 1-2 weeks in office at [phone]  Follow up with PCP concerning chest xray results, regarding hilar mass work-up, possible CT chest  Follow up with repeat venous duplex in 2 weeks  Secondary Diagnosis:	Vascular disease, peripheral  Goal:	Recovery  Assessment and plan of treatment:	General Discharge Instructions:  Please resume all regular home medications unless specifically advised not to take a particular medication. Also, please take any new medications as prescribed.  Please get plenty of rest, continue to ambulate several times per day, and drink adequate amounts of fluids. Avoid lifting weights greater than 5-10 lbs until you follow-up with your surgeon, who will instruct you further regarding activity restrictions.  Avoid driving or operating heavy machinery while taking pain medications.  Please follow-up with your surgeon and Primary Care Provider (PCP) as advised.  Incision Care:  *Please call your doctor or nurse practitioner if you have increased pain, swelling, redness, or drainage from the incision site.  *Avoid swimming and baths until your follow-up appointment.  *You may shower, and wash surgical incisions with a mild soap and warm water. Gently pat the area dry.  Secondary Diagnosis:	Pain in both lower extremities  Goal:	Warning signs  Assessment and plan of treatment:	Warning Signs:  Please call your doctor or nurse practitioner if you experience the following:  *You experience new chest pain, pressure, squeezing or tightness.  *New or worsening cough, shortness of breath, or wheeze.  *If you are vomiting and cannot keep down fluids or your medications.  *You are getting dehydrated due to continued vomiting, diarrhea, or other reasons. Signs of dehydration include dry mouth, rapid heartbeat, or feeling dizzy or faint when standing.  *You see blood or dark/black material when you vomit or have a bowel movement.  *You experience burning when you urinate, have blood in your urine, or experience a discharge.  *Your pain is not improving within 8-12 hours or is not gone within 24 hours. Call or return immediately if your pain is getting worse, changes location, or moves to your chest or back.  *You have shaking chills, or fever greater than 101.5 degrees Fahrenheit or 38 degrees Celsius.  *Any change in your symptoms, or any new symptoms that concern you.  Goal:	Wound care  Assessment and plan of treatment:	wet to dry dressing changes to bilateral lower extremities. please apply moisturizer to both legs, damp 4x4 on affected areas, wrap with krilex, and then ace bandage for compression, elevate bilateral legs Principal Discharge DX:	Cellulitis of lower extremity, unspecified laterality  Goal:	Follow up  Assessment and plan of treatment:	Follow up with Dr. Hairston in 1-2 weeks in office at [phone]  Follow up with PCP concerning chest xray results, regarding hilar mass vs vascularity work-up, possible CT chest will be needed.  XRAY READ: : Enlarged ana, right greater than left. Although findings   could be due to enlarged hilar vasculature, recommend correlation with   contrast enhanced CT to exclude lymphadenopathy or mass.  Follow up with repeat venous duplex in 2 weeks  Secondary Diagnosis:	Vascular disease, peripheral  Goal:	Recovery  Assessment and plan of treatment:	General Discharge Instructions:  Please resume all regular home medications unless specifically advised not to take a particular medication. Also, please take any new medications as prescribed.  Please get plenty of rest, continue to ambulate several times per day, and drink adequate amounts of fluids. Avoid lifting weights greater than 5-10 lbs until you follow-up with your surgeon, who will instruct you further regarding activity restrictions.  Avoid driving or operating heavy machinery while taking pain medications.  Please follow-up with your surgeon and Primary Care Provider (PCP) as advised.  Incision Care:  *Please call your doctor or nurse practitioner if you have increased pain, swelling, redness, or drainage from the incision site.  *Avoid swimming and baths until your follow-up appointment.  *You may shower, and wash surgical incisions with a mild soap and warm water. Gently pat the area dry.  Secondary Diagnosis:	Pain in both lower extremities  Goal:	Warning signs  Assessment and plan of treatment:	Warning Signs:  Please call your doctor or nurse practitioner if you experience the following:  *You experience new chest pain, pressure, squeezing or tightness.  *New or worsening cough, shortness of breath, or wheeze.  *If you are vomiting and cannot keep down fluids or your medications.  *You are getting dehydrated due to continued vomiting, diarrhea, or other reasons. Signs of dehydration include dry mouth, rapid heartbeat, or feeling dizzy or faint when standing.  *You see blood or dark/black material when you vomit or have a bowel movement.  *You experience burning when you urinate, have blood in your urine, or experience a discharge.  *Your pain is not improving within 8-12 hours or is not gone within 24 hours. Call or return immediately if your pain is getting worse, changes location, or moves to your chest or back.  *You have shaking chills, or fever greater than 101.5 degrees Fahrenheit or 38 degrees Celsius.  *Any change in your symptoms, or any new symptoms that concern you.  Goal:	Wound care  Assessment and plan of treatment:	wet to dry dressing changes to bilateral lower extremities. please apply moisturizer to both legs, damp 4x4 on affected areas, wrap with krelix, and then ace bandage for compression, elevate bilateral legs Principal Discharge DX:	Cellulitis of lower extremity, unspecified laterality  Goal:	Follow up  Assessment and plan of treatment:	Follow up with Dr. Hairston in 1-2 weeks in office at [phone]  Follow up with PCP concerning chest xray results, regarding hilar mass vs vascularity work-up, possible CT chest will be needed.  XRAY READ: : Enlarged ana, right greater than left. Although findings   could be due to enlarged hilar vasculature, recommend correlation with   contrast enhanced CT to exclude lymphadenopathy or mass.  Follow up with repeat venous duplex in 2 weeks  Secondary Diagnosis:	Vascular disease, peripheral  Goal:	Recovery  Assessment and plan of treatment:	General Discharge Instructions:  Please resume all regular home medications unless specifically advised not to take a particular medication. Also, please take any new medications as prescribed.  Please get plenty of rest, continue to ambulate several times per day, and drink adequate amounts of fluids. Avoid lifting weights greater than 5-10 lbs until you follow-up with your surgeon, who will instruct you further regarding activity restrictions.  Avoid driving or operating heavy machinery while taking pain medications.  Please follow-up with your surgeon and Primary Care Provider (PCP) as advised.  Incision Care:  *Please call your doctor or nurse practitioner if you have increased pain, swelling, redness, or drainage from the incision site.  *Avoid swimming and baths until your follow-up appointment.  *You may shower, and wash surgical incisions with a mild soap and warm water. Gently pat the area dry.  Secondary Diagnosis:	Pain in both lower extremities  Goal:	Warning signs  Assessment and plan of treatment:	Warning Signs:  Please call your doctor or nurse practitioner if you experience the following:  *You experience new chest pain, pressure, squeezing or tightness.  *New or worsening cough, shortness of breath, or wheeze.  *If you are vomiting and cannot keep down fluids or your medications.  *You are getting dehydrated due to continued vomiting, diarrhea, or other reasons. Signs of dehydration include dry mouth, rapid heartbeat, or feeling dizzy or faint when standing.  *You see blood or dark/black material when you vomit or have a bowel movement.  *You experience burning when you urinate, have blood in your urine, or experience a discharge.  *Your pain is not improving within 8-12 hours or is not gone within 24 hours. Call or return immediately if your pain is getting worse, changes location, or moves to your chest or back.  *You have shaking chills, or fever greater than 101.5 degrees Fahrenheit or 38 degrees Celsius.  *Any change in your symptoms, or any new symptoms that concern you.  Goal:	Wound care  Assessment and plan of treatment:	Saline wet to dry dressing changes to bilateral lower extremities superficial ulcers. Please apply moisturizer to both legs, avoid ulcers.   Saline moistened 2x2 gauze  on affected areas, wrap with krelix, and then ace bandage for compression, elevate bilateral legs.  Continue Cipro 500mg 2x daily for 14 days.

## 2018-10-25 NOTE — PROGRESS NOTE ADULT - SUBJECTIVE AND OBJECTIVE BOX
O/N: VSS, RLE doppler-> non occl thrombus deep femoral vein, intramuscular  thrombus  non compressible                             Assessment/Plan:  78yoM with b/l LE skin breakdown, swelling and pain      -pain control  -cont home meds  -Ciprofloxacin  -f/u AM labs  -local wound care O/N: VSS, RLE doppler-> non occl thrombus deep femoral vein, intramuscular  thrombus  non compressible     SUBJECTIVE:  pt seen at bedside, without complaints at this time    MEDICATIONS  (STANDING):  aspirin enteric coated 81 milliGRAM(s) Oral daily  cefepime   IVPB 2000 milliGRAM(s) IV Intermittent every 24 hours  cefepime   IVPB      docusate sodium 100 milliGRAM(s) Oral three times a day  furosemide    Tablet 80 milliGRAM(s) Oral daily  heparin  Injectable 5000 Unit(s) SubCutaneous every 8 hours  influenza   Vaccine 0.5 milliLiter(s) IntraMuscular once  lisinopril 5 milliGRAM(s) Oral daily  nystatin Cream 1 Application(s) Topical two times a day  potassium chloride   Powder 40 milliEquivalent(s) Oral once  senna 2 Tablet(s) Oral at bedtime  tamsulosin 0.4 milliGRAM(s) Oral at bedtime    MEDICATIONS  (PRN):  oxyCODONE    5 mG/acetaminophen 325 mG 2 Tablet(s) Oral every 6 hours PRN Severe Pain (7 - 10)  oxyCODONE    5 mG/acetaminophen 325 mG 1 Tablet(s) Oral every 6 hours PRN Moderate Pain (4 - 6)      Vital Signs Last 24 Hrs  T(C): 36.1 (25 Oct 2018 05:11), Max: 36.8 (24 Oct 2018 13:51)  T(F): 96.9 (25 Oct 2018 05:11), Max: 98.3 (24 Oct 2018 13:51)  HR: 78 (25 Oct 2018 05:25) (60 - 78)  BP: 108/65 (25 Oct 2018 05:25) (98/67 - 127/69)  BP(mean): --  RR: 18 (25 Oct 2018 05:25) (18 - 18)  SpO2: 98% (25 Oct 2018 05:25) (95% - 98%)    PHYSICAL EXAM:      Constitutional: A&Ox3    Respiratory: non labored breathing, no respiratory distress    Cardiovascular: NSR, RRR    Gastrointestinal: soft, nontender, nondistended    Extremities: (-) edema, bilateral venous stasis changes, left second toe wound without discharge                  I&O's Detail    24 Oct 2018 07:01  -  25 Oct 2018 07:00  --------------------------------------------------------  IN:    Oral Fluid: 360 mL  Total IN: 360 mL    OUT:    Voided: 500 mL  Total OUT: 500 mL    Total NET: -140 mL          LABS:                        13.0   6.5   )-----------( 182      ( 24 Oct 2018 12:37 )             39.4     10-24    135  |  93<L>  |  41<H>  ----------------------------<  108<H>  3.7   |  27  |  1.31<H>    Ca    9.0      24 Oct 2018 12:37  Mg     2.1     10-24    TPro  6.2  /  Alb  3.0<L>  /  TBili  1.3<H>  /  DBili  x   /  AST  11  /  ALT  12  /  AlkPhos  115  10-24    PT/INR - ( 23 Oct 2018 11:24 )   PT: 16.7 sec;   INR: 1.49          PTT - ( 23 Oct 2018 11:24 )  PTT:92.0 sec      RADIOLOGY & ADDITIONAL STUDIES:      Assessment/Plan:  78yoM with b/l LE skin breakdown, swelling and pain      -pain control  -cont home meds  -Ciprofloxacin  -f/u AM labs  -local wound care

## 2018-10-25 NOTE — DISCHARGE NOTE ADULT - MEDICATION SUMMARY - MEDICATIONS TO TAKE
I will START or STAY ON the medications listed below when I get home from the hospital:    oxyCODONE-acetaminophen 5 mg-325 mg oral tablet  -- 2 tab(s) by mouth every 6 hours, As needed, Severe Pain (7 - 10)  -- Indication: For severe pain    oxyCODONE-acetaminophen 5 mg-325 mg oral tablet  -- 1 tab(s) by mouth every 6 hours, As needed, Moderate Pain (4 - 6)  -- Indication: For moderate pain    aspirin 81 mg oral tablet  -- 1 tab(s) by mouth once a day  -- Indication: For Vascular disease, peripheral    lisinopril 5 mg oral tablet  -- 1 tab(s) by mouth once a day  -- Indication: For HTN (hypertension)    tamsulosin 0.4 mg oral capsule  -- 1 cap(s) by mouth once a day  -- Indication: For BPH (benign prostatic hyperplasia)    furosemide 80 mg oral tablet  -- 1 tab(s) by mouth once a day  -- Indication: For HTN (hypertension)    docusate sodium 100 mg oral capsule  -- 1 cap(s) by mouth 3 times a day  -- Indication: For Constipation    senna oral tablet  -- 2 tab(s) by mouth once a day (at bedtime)  -- Indication: For Constipation I will START or STAY ON the medications listed below when I get home from the hospital:    oxyCODONE-acetaminophen 5 mg-325 mg oral tablet  -- 2 tab(s) by mouth every 6 hours, As needed, Severe Pain (7 - 10)  -- Indication: For severe pain    oxyCODONE-acetaminophen 5 mg-325 mg oral tablet  -- 1 tab(s) by mouth every 6 hours, As needed, Moderate Pain (4 - 6)  -- Indication: For moderate pain    aspirin 81 mg oral tablet  -- 1 tab(s) by mouth once a day  -- Indication: For Vascular disease, peripheral    lisinopril 5 mg oral tablet  -- 1 tab(s) by mouth once a day  -- Indication: For HTN (hypertension)    tamsulosin 0.4 mg oral capsule  -- 1 cap(s) by mouth once a day  -- Indication: For BPH (benign prostatic hyperplasia)    furosemide 80 mg oral tablet  -- 1 tab(s) by mouth once a day  -- Indication: For HTN (hypertension)    docusate sodium 100 mg oral capsule  -- 1 cap(s) by mouth 3 times a day  -- Indication: For Constipation    senna oral tablet  -- 2 tab(s) by mouth once a day (at bedtime)  -- Indication: For Constipation    ciprofloxacin 500 mg oral tablet  -- 1 tab(s) by mouth 2 times a day MDD:2  -- Avoid prolonged or excessive exposure to direct and/or artificial sunlight while taking this medication.  Check with your doctor before becoming pregnant.  Do not take dairy products, antacids, or iron preparations within one hour of this medication.  Finish all this medication unless otherwise directed by prescriber.  Medication should be taken with plenty of water.    -- Indication: For Cellulitis of lower extremity, unspecified laterality I will START or STAY ON the medications listed below when I get home from the hospital:    oxyCODONE-acetaminophen 5 mg-325 mg oral tablet  -- 2 tab(s) by mouth every 6 hours, As needed, Severe Pain (7 - 10)  -- Indication: For severe pain    oxyCODONE-acetaminophen 5 mg-325 mg oral tablet  -- 1 tab(s) by mouth every 6 hours, As needed, Moderate Pain (4 - 6)  -- Indication: For moderate pain    aspirin 81 mg oral tablet  -- 1 tab(s) by mouth once a day  -- Indication: For Vascular disease, peripheral    lisinopril 5 mg oral tablet  -- 1 tab(s) by mouth once a day  -- Indication: For HTN (hypertension)    tamsulosin 0.4 mg oral capsule  -- 1 cap(s) by mouth once a day  -- Indication: For BPH (benign prostatic hyperplasia)    furosemide 80 mg oral tablet  -- 1 tab(s) by mouth once a day  -- Indication: For HTN (hypertension)    docusate sodium 100 mg oral capsule  -- 1 cap(s) by mouth 3 times a day  -- Indication: For Constipation    senna oral tablet  -- 2 tab(s) by mouth once a day (at bedtime)  -- Indication: For Constipation    ciprofloxacin 500 mg oral tablet  -- 1 tab(s) by mouth 2 times a day MDD:2  -- Avoid prolonged or excessive exposure to direct and/or artificial sunlight while taking this medication.  Check with your doctor before becoming pregnant.  Do not take dairy products, antacids, or iron preparations within one hour of this medication.  Finish all this medication unless otherwise directed by prescriber.  Medication should be taken with plenty of water.    -- Indication: For Cellulitis of lower extremity, unspecified laterality. 2 week course.

## 2018-10-25 NOTE — DISCHARGE NOTE ADULT - PLAN OF CARE
Follow up Follow up with Dr. Hairston in 1-2 weeks in office at [phone] Recovery General Discharge Instructions:  Please resume all regular home medications unless specifically advised not to take a particular medication. Also, please take any new medications as prescribed.  Please get plenty of rest, continue to ambulate several times per day, and drink adequate amounts of fluids. Avoid lifting weights greater than 5-10 lbs until you follow-up with your surgeon, who will instruct you further regarding activity restrictions.  Avoid driving or operating heavy machinery while taking pain medications.  Please follow-up with your surgeon and Primary Care Provider (PCP) as advised.  Incision Care:  *Please call your doctor or nurse practitioner if you have increased pain, swelling, redness, or drainage from the incision site.  *Avoid swimming and baths until your follow-up appointment.  *You may shower, and wash surgical incisions with a mild soap and warm water. Gently pat the area dry. Warning signs Warning Signs:  Please call your doctor or nurse practitioner if you experience the following:  *You experience new chest pain, pressure, squeezing or tightness.  *New or worsening cough, shortness of breath, or wheeze.  *If you are vomiting and cannot keep down fluids or your medications.  *You are getting dehydrated due to continued vomiting, diarrhea, or other reasons. Signs of dehydration include dry mouth, rapid heartbeat, or feeling dizzy or faint when standing.  *You see blood or dark/black material when you vomit or have a bowel movement.  *You experience burning when you urinate, have blood in your urine, or experience a discharge.  *Your pain is not improving within 8-12 hours or is not gone within 24 hours. Call or return immediately if your pain is getting worse, changes location, or moves to your chest or back.  *You have shaking chills, or fever greater than 101.5 degrees Fahrenheit or 38 degrees Celsius.  *Any change in your symptoms, or any new symptoms that concern you. Follow up with Dr. Hairston in 1-2 weeks in office at [phone]  Follow up with PCP concerning chest xray results  Follow up with repeat venous duplex in 2 weeks Wound care wet to dry dressing changes to bilateral lower extremities. damp 4x4 on affected areas, wrap with krilex, and then ace bandage wet to dry dressing changes to bilateral lower extremities. please apply moisturizer to both legs, damp 4x4 on affected areas, wrap with krilex, and then ace bandage for compression, elevate bilateral legs Follow up with Dr. Hairston in 1-2 weeks in office at [phone]  Follow up with PCP concerning chest xray results, regarding hilar mass work-up, possible CT chest  Follow up with repeat venous duplex in 2 weeks wet to dry dressing changes to bilateral lower extremities. please apply moisturizer to both legs, damp 4x4 on affected areas, wrap with krelix, and then ace bandage for compression, elevate bilateral legs Follow up with Dr. Hairston in 1-2 weeks in office at [phone]  Follow up with PCP concerning chest xray results, regarding hilar mass vs vascularity work-up, possible CT chest will be needed.  XRAY READ: : Enlarged ana, right greater than left. Although findings   could be due to enlarged hilar vasculature, recommend correlation with   contrast enhanced CT to exclude lymphadenopathy or mass.  Follow up with repeat venous duplex in 2 weeks Saline wet to dry dressing changes to bilateral lower extremities superficial ulcers. Please apply moisturizer to both legs, avoid ulcers.   Saline moistened 2x2 gauze  on affected areas, wrap with krelix, and then ace bandage for compression, elevate bilateral legs.  Continue Cipro 500mg 2x daily for 14 days.

## 2018-10-25 NOTE — DISCHARGE NOTE ADULT - PATIENT PORTAL LINK FT
You can access the [facility] Patient Portal, offered by [facility], by registering with the following website: [email or web address]

## 2018-10-25 NOTE — DISCHARGE NOTE ADULT - HOSPITAL COURSE
78 year old male [employer] H[employer]ORIAN presented to ED with concern for lower extremity pain and swelling - sent by his PCP per report for vascular evaluation.  Patient noted he has had skin breakdown to bilateral lower extremities over the past several months, however felt it is getting significantly worse as of late.  He presented to ED with bilateral LEs wrapped in gauze and kerlex with small amount of weapage and foul smell coming from dressings.  Per house aide at bedside, patient currently resides in an assisted living facility, though she did not know if his dressings are being changed there.  Patient denied associated fever, chills, chest pain, shortness of breath, cough, abdominal pain, nausea, vomiting or any additional acute complaints or concerns at time of admission. He was admitted to the vascular surgery service for anticoagulation and IV antibiotics. RLE doppler revealed non oscclusion thrombus deep femoral vein, intramuscular thrombus non compressible. On day of discharge, patient was stable to go back to nursing home.

## 2018-10-27 LAB
CULTURE RESULTS: SIGNIFICANT CHANGE UP
CULTURE RESULTS: SIGNIFICANT CHANGE UP
SPECIMEN SOURCE: SIGNIFICANT CHANGE UP
SPECIMEN SOURCE: SIGNIFICANT CHANGE UP

## 2018-10-30 DIAGNOSIS — I82.411 ACUTE EMBOLISM AND THROMBOSIS OF RIGHT FEMORAL VEIN: ICD-10-CM

## 2018-10-30 DIAGNOSIS — L03.116 CELLULITIS OF LEFT LOWER LIMB: ICD-10-CM

## 2018-10-30 DIAGNOSIS — L03.115 CELLULITIS OF RIGHT LOWER LIMB: ICD-10-CM

## 2018-10-30 DIAGNOSIS — N40.0 BENIGN PROSTATIC HYPERPLASIA WITHOUT LOWER URINARY TRACT SYMPTOMS: ICD-10-CM

## 2018-10-30 DIAGNOSIS — I73.9 PERIPHERAL VASCULAR DISEASE, UNSPECIFIED: ICD-10-CM

## 2018-10-30 DIAGNOSIS — I10 ESSENTIAL (PRIMARY) HYPERTENSION: ICD-10-CM

## 2018-11-11 PROCEDURE — 87070 CULTURE OTHR SPECIMN AEROBIC: CPT

## 2018-11-11 PROCEDURE — 83036 HEMOGLOBIN GLYCOSYLATED A1C: CPT

## 2018-11-11 PROCEDURE — 87186 SC STD MICRODIL/AGAR DIL: CPT

## 2018-11-11 PROCEDURE — 71045 X-RAY EXAM CHEST 1 VIEW: CPT

## 2018-11-11 PROCEDURE — 86850 RBC ANTIBODY SCREEN: CPT

## 2018-11-11 PROCEDURE — 83735 ASSAY OF MAGNESIUM: CPT

## 2018-11-11 PROCEDURE — 84100 ASSAY OF PHOSPHORUS: CPT

## 2018-11-11 PROCEDURE — 85610 PROTHROMBIN TIME: CPT

## 2018-11-11 PROCEDURE — 36415 COLL VENOUS BLD VENIPUNCTURE: CPT

## 2018-11-11 PROCEDURE — 87040 BLOOD CULTURE FOR BACTERIA: CPT

## 2018-11-11 PROCEDURE — 85027 COMPLETE CBC AUTOMATED: CPT

## 2018-11-11 PROCEDURE — 96374 THER/PROPH/DIAG INJ IV PUSH: CPT

## 2018-11-11 PROCEDURE — 93971 EXTREMITY STUDY: CPT

## 2018-11-11 PROCEDURE — 80053 COMPREHEN METABOLIC PANEL: CPT

## 2018-11-11 PROCEDURE — 86900 BLOOD TYPING SEROLOGIC ABO: CPT

## 2018-11-11 PROCEDURE — 86901 BLOOD TYPING SEROLOGIC RH(D): CPT

## 2018-11-11 PROCEDURE — 85025 COMPLETE CBC W/AUTO DIFF WBC: CPT

## 2018-11-11 PROCEDURE — 80202 ASSAY OF VANCOMYCIN: CPT

## 2018-11-11 PROCEDURE — 99285 EMERGENCY DEPT VISIT HI MDM: CPT | Mod: 25

## 2018-11-11 PROCEDURE — 93970 EXTREMITY STUDY: CPT

## 2018-11-11 PROCEDURE — 71046 X-RAY EXAM CHEST 2 VIEWS: CPT

## 2018-11-11 PROCEDURE — 80048 BASIC METABOLIC PNL TOTAL CA: CPT

## 2018-11-11 PROCEDURE — 97162 PT EVAL MOD COMPLEX 30 MIN: CPT

## 2018-11-11 PROCEDURE — 85730 THROMBOPLASTIN TIME PARTIAL: CPT

## 2021-06-23 NOTE — ED ADULT TRIAGE NOTE - NS ED NURSE AMBULANCES
[facility] Niacinamide Pregnancy And Lactation Text: These medications are considered safe during pregnancy.

## 2024-01-15 NOTE — PATIENT PROFILE ADULT - NSPROPOAURINARYCATHETER_GEN_A_NUR
Patient requesting refill on     Requested Prescriptions     Pending Prescriptions Disp Refills    fenofibrate (TRICOR) 145 MG tablet [Pharmacy Med Name: FENOFIBRATE TABS 145MG] 90 tablet 3     Sig: TAKE 1 TABLET DAILY FOR HIGH AMOUNT OF TRIGLYCERIDE IN THE BLOOD        Last OV 1/17/2023   
no